# Patient Record
Sex: FEMALE | Race: BLACK OR AFRICAN AMERICAN | NOT HISPANIC OR LATINO | Employment: OTHER | URBAN - METROPOLITAN AREA
[De-identification: names, ages, dates, MRNs, and addresses within clinical notes are randomized per-mention and may not be internally consistent; named-entity substitution may affect disease eponyms.]

---

## 2019-01-31 ENCOUNTER — HOSPITAL ENCOUNTER (EMERGENCY)
Facility: OTHER | Age: 49
Discharge: HOME | End: 2019-01-31
Attending: EMERGENCY MEDICINE
Payer: MEDICARE

## 2019-01-31 VITALS
SYSTOLIC BLOOD PRESSURE: 162 MMHG | HEART RATE: 82 BPM | OXYGEN SATURATION: 98 % | WEIGHT: 250 LBS | DIASTOLIC BLOOD PRESSURE: 94 MMHG | TEMPERATURE: 99 F | BODY MASS INDEX: 39.24 KG/M2 | RESPIRATION RATE: 14 BRPM | HEIGHT: 67 IN

## 2019-01-31 DIAGNOSIS — R55 SYNCOPE AND COLLAPSE: Primary | ICD-10-CM

## 2019-01-31 DIAGNOSIS — R03.0 BLOOD PRESSURE ELEVATED WITHOUT HISTORY OF HTN: ICD-10-CM

## 2019-01-31 LAB
ALBUMIN SERPL BCP-MCNC: 3.2 G/DL
ALP SERPL-CCNC: 56 U/L
ALT SERPL W/O P-5'-P-CCNC: 22 U/L
ANION GAP SERPL CALC-SCNC: 11 MMOL/L
AST SERPL-CCNC: 23 U/L
BASOPHILS # BLD AUTO: 0.02 K/UL
BASOPHILS NFR BLD: 0.4 %
BILIRUB SERPL-MCNC: 0.1 MG/DL
BUN SERPL-MCNC: 14 MG/DL
CALCIUM SERPL-MCNC: 9.1 MG/DL
CHLORIDE SERPL-SCNC: 108 MMOL/L
CO2 SERPL-SCNC: 23 MMOL/L
CREAT SERPL-MCNC: 0.9 MG/DL
DIFFERENTIAL METHOD: ABNORMAL
EOSINOPHIL # BLD AUTO: 0.1 K/UL
EOSINOPHIL NFR BLD: 1.7 %
ERYTHROCYTE [DISTWIDTH] IN BLOOD BY AUTOMATED COUNT: 14.9 %
EST. GFR  (AFRICAN AMERICAN): >60 ML/MIN/1.73 M^2
EST. GFR  (NON AFRICAN AMERICAN): >60 ML/MIN/1.73 M^2
GLUCOSE SERPL-MCNC: 125 MG/DL
HCT VFR BLD AUTO: 35.2 %
HGB BLD-MCNC: 11.4 G/DL
INFLUENZA A, MOLECULAR: NEGATIVE
INFLUENZA B, MOLECULAR: NEGATIVE
LYMPHOCYTES # BLD AUTO: 1.7 K/UL
LYMPHOCYTES NFR BLD: 38 %
MCH RBC QN AUTO: 28.6 PG
MCHC RBC AUTO-ENTMCNC: 32.4 G/DL
MCV RBC AUTO: 88 FL
MONOCYTES # BLD AUTO: 0.4 K/UL
MONOCYTES NFR BLD: 9 %
NEUTROPHILS # BLD AUTO: 2.3 K/UL
NEUTROPHILS NFR BLD: 50.7 %
PLATELET # BLD AUTO: 268 K/UL
PMV BLD AUTO: 10.2 FL
POTASSIUM SERPL-SCNC: 3.9 MMOL/L
PROT SERPL-MCNC: 7.5 G/DL
RBC # BLD AUTO: 3.99 M/UL
SODIUM SERPL-SCNC: 142 MMOL/L
SPECIMEN SOURCE: NORMAL
TROPONIN I SERPL DL<=0.01 NG/ML-MCNC: <0.006 NG/ML
WBC # BLD AUTO: 4.58 K/UL

## 2019-01-31 PROCEDURE — 87502 INFLUENZA DNA AMP PROBE: CPT

## 2019-01-31 PROCEDURE — 85025 COMPLETE CBC W/AUTO DIFF WBC: CPT

## 2019-01-31 PROCEDURE — 80053 COMPREHEN METABOLIC PANEL: CPT

## 2019-01-31 PROCEDURE — 84484 ASSAY OF TROPONIN QUANT: CPT

## 2019-01-31 PROCEDURE — 93010 EKG 12-LEAD: ICD-10-PCS | Mod: ,,, | Performed by: INTERNAL MEDICINE

## 2019-01-31 PROCEDURE — 93005 ELECTROCARDIOGRAM TRACING: CPT

## 2019-01-31 PROCEDURE — 82962 GLUCOSE BLOOD TEST: CPT

## 2019-01-31 PROCEDURE — 93010 ELECTROCARDIOGRAM REPORT: CPT | Mod: ,,, | Performed by: INTERNAL MEDICINE

## 2019-01-31 PROCEDURE — 99284 EMERGENCY DEPT VISIT MOD MDM: CPT | Mod: 25

## 2019-02-01 LAB — POCT GLUCOSE: 134 MG/DL (ref 70–110)

## 2019-02-01 NOTE — ED NOTES
Pt in the semi- logan's position, A & O x 3, denies seizure activity. Respirations are even and unlabored. VS. Will continue to monitor closely.

## 2019-02-01 NOTE — ED PROVIDER NOTES
"Encounter Date: 1/31/2019    SCRIBE #1 NOTE: I, Pantera Sterling, am scribing for, and in the presence of, Dr. Iraheta.       History     Chief Complaint   Patient presents with    Seizure     pt had witnessed seizure by family, reports Hx of seizures but states she has never been started on medication, reports last seizure was over a year ago     Time seen by provider: 7:15 PM    This is a 48 y.o. female who presents with complaint of syncope that occurred prior to arrival. The patient reports that she wasn't feeling well and took Theraflu. She reports that she started to feel very weak and the lost consciousness. The patient's aunt reports that she was experiencing "seizure jesus activity" while unconscious. The patient states that she had a seizure approximately three years ago and never followed up with Neurology. She denies fever, sore throat, chest pain, shortness of breath, nausea, and dysuria.       The history is provided by the patient and a relative (Aunt).     Review of patient's allergies indicates:  Allergies no known allergies  No past medical history on file.  No past surgical history on file.  No family history on file.  Social History     Tobacco Use    Smoking status: Not on file   Substance Use Topics    Alcohol use: Not on file    Drug use: Not on file     Review of Systems   Constitutional: Negative for fever.   HENT: Negative for sore throat.    Respiratory: Negative for shortness of breath.    Cardiovascular: Negative for chest pain.   Gastrointestinal: Negative for nausea.   Genitourinary: Negative for dysuria.   Musculoskeletal: Negative for back pain.   Skin: Negative for rash.   Neurological: Positive for seizures, syncope and weakness.   Hematological: Does not bruise/bleed easily.       Physical Exam     Initial Vitals [01/31/19 1909]   BP Pulse Resp Temp SpO2   (!) 178/98 82 18 98.5 °F (36.9 °C) 97 %      MAP       --         Physical Exam    Nursing note and vitals " reviewed.  Constitutional: She appears well-developed and well-nourished. She is not diaphoretic. No distress.   HENT:   Head: Normocephalic and atraumatic.   Mouth/Throat: Oropharynx is clear and moist.   Eyes: Conjunctivae and EOM are normal. Pupils are equal, round, and reactive to light.   Neck: Normal range of motion. Neck supple.   Cardiovascular: Normal rate, regular rhythm and normal heart sounds. Exam reveals no gallop and no friction rub.    No murmur heard.  Pulmonary/Chest: Breath sounds normal. No respiratory distress. She has no wheezes. She has no rhonchi. She has no rales.   Abdominal: Soft. There is no tenderness. There is no rebound and no guarding.   Musculoskeletal: Normal range of motion. She exhibits no edema or tenderness.   Neurological: She is alert and oriented to person, place, and time. She has normal strength and normal reflexes. She displays normal reflexes. No cranial nerve deficit or sensory deficit. GCS score is 15. GCS eye subscore is 4. GCS verbal subscore is 5. GCS motor subscore is 6.   Strength 5/5 throughout. Sensation intact to light touch throughout. Reflexes 2+ throughout. Two point discrimination is intact. Good finger to nose task ability. Negative pronator drift.    Skin: Skin is warm and dry. No rash and no abscess noted. No erythema. No pallor.   Psychiatric: She has a normal mood and affect. Her behavior is normal. Judgment and thought content normal.         ED Course   Procedures  Labs Reviewed   CBC W/ AUTO DIFFERENTIAL - Abnormal; Notable for the following components:       Result Value    RBC 3.99 (*)     Hemoglobin 11.4 (*)     Hematocrit 35.2 (*)     RDW 14.9 (*)     All other components within normal limits   COMPREHENSIVE METABOLIC PANEL - Abnormal; Notable for the following components:    Glucose 125 (*)     Albumin 3.2 (*)     All other components within normal limits   INFLUENZA A & B BY MOLECULAR   TROPONIN I     EKG Readings: (Independently Interpreted)    Normal sinus rhythm at a rate of 81 bpm.       Imaging Results          CT Head Without Contrast (Final result)  Result time 01/31/19 19:57:52    Final result by Tommie Li MD (01/31/19 19:57:52)                 Impression:      No acute intracranial abnormalities identified.      Electronically signed by: Tommie Li MD  Date:    01/31/2019  Time:    19:57             Narrative:    EXAMINATION:  CT HEAD WITHOUT CONTRAST    CLINICAL HISTORY:  Possible Seizure;    TECHNIQUE:  Low dose axial images were obtained through the head.  Coronal and sagittal reformations were also performed. Contrast was not administered.    COMPARISON:  None.    FINDINGS:  The brain is normally formed and exhibits normal density throughout with no indication of acute/recent major vascular distribution cerebral infarction, intraparenchymal hemorrhage, or intra-axial space occupying lesion. The ventricular system is normal in size and configuration with no evidence of hydrocephalus. No effacement of the skull-base cisterns. No abnormal extra-axial fluid collections or blood products. The visualized paranasal sinuses and mastoid air cells are clear. The calvarium shows no significant abnormality.                               X-Ray Chest PA And Lateral (Final result)  Result time 01/31/19 19:53:30    Final result by Tommie Li MD (01/31/19 19:53:30)                 Impression:      No acute cardiopulmonary process identified.      Electronically signed by: Tommie Li MD  Date:    01/31/2019  Time:    19:53             Narrative:    EXAMINATION:  XR CHEST PA AND LATERAL    CLINICAL HISTORY:  Cough;    TECHNIQUE:  PA and lateral views of the chest were performed.    COMPARISON:  None    FINDINGS:  Cardiac silhouette is upper limits of normal in size.  Skin folds project over the bilateral [place] thoraces.  Lungs are symmetrically expanded.  No evidence of focal consolidative process, pneumothorax, or significant effusion.   No acute osseous abnormality identified.                              X-Rays:   Independently Interpreted Readings:   Chest X-Ray: No acute findings.     Medical Decision Making:   Clinical Tests:   Lab Tests: Ordered and Reviewed  Radiological Study: Ordered and Reviewed  Medical Tests: Ordered and Reviewed            Scribe Attestation:   Scribe #1: I performed the above scribed service and the documentation accurately describes the services I performed. I attest to the accuracy of the note.    Attending Attestation:           Physician Attestation for Scribe:  Physician Attestation Statement for Scribe #1: I, Dr. Iraheta, reviewed documentation, as scribed by Pantera Sterling in my presence, and it is both accurate and complete.         Attending ED Notes:   Emergent evaluation of a 48-year-old female with complaint of cough, cold, nasal congestion and generalized weakness with a witnessed syncopal episode.  Family describes possible seizure activity but unsure.  Patient has no history of seizures.  There is no urinary incontinence.  No head trauma. No biting of the tongue.  Patient was not confused upon waking.  On examination patient is afebrile, nontoxic-appearing stable vital signs.  Patient is neurovascularly intact without focal neurologic deficits.  Patient has no midline C-spine, T-spine or L-spine tenderness to palpation, crepitus or step-offs.  Influenza screen is negative.  Patient has no elevation of white blood cell count.  H&H 11.4 and 35.2.  No acute findings on CMP.  No acute findings on CT of the head.  The patient is extensively counseled on her diagnosis and treatment including all diagnostic, laboratory and physical exam findings.  Given patient's history of present illness, clinical presentation and physical exam I do not believe patient had a seizure.  Patient will take seizure precautions anyhow until further evaluation by Neurology.  The patient discharged good condition and directed to  follow up with both her PCP and Neurology within the next 48-72 hours.             Clinical Impression:     1. Syncope and collapse    2. Blood pressure elevated without history of HTN                               Won Iraheta MD  01/31/19 2059

## 2019-02-01 NOTE — ED NOTES
Pt states she passed out, then had a seizure. Pt denies hitting her head, head/neck pain. Pt states she was alert after the seizure and denies hurting herself when she fell. Pt denies biting her tongue and urinary incontinence. Pt told EMS her last seizure was 3 yrs ago and she is able to control her seizures. Pt is A & O x 3, denies SOB, fever, chills and N/V/D. Skin is warm and dry w/ pink mucosa. VSS. BUTCH x 3mm. BBS- CTA. Abd- SNT. PSM x 4 exts. Pt is connected to the pulse ox, B/P cuff and EKG monitor . Bed is locked and in the low position w/ the side rails up and locked for pt safety. Call bell and pts  at the BS. Will continue to monitor closely.